# Patient Record
Sex: FEMALE | Race: WHITE | HISPANIC OR LATINO | Employment: FULL TIME | URBAN - METROPOLITAN AREA
[De-identification: names, ages, dates, MRNs, and addresses within clinical notes are randomized per-mention and may not be internally consistent; named-entity substitution may affect disease eponyms.]

---

## 2021-03-17 ENCOUNTER — OFFICE VISIT (OUTPATIENT)
Dept: URGENT CARE | Facility: CLINIC | Age: 36
End: 2021-03-17
Payer: COMMERCIAL

## 2021-03-17 VITALS
OXYGEN SATURATION: 96 % | TEMPERATURE: 97.6 F | HEIGHT: 65 IN | SYSTOLIC BLOOD PRESSURE: 136 MMHG | BODY MASS INDEX: 43.25 KG/M2 | DIASTOLIC BLOOD PRESSURE: 89 MMHG | RESPIRATION RATE: 14 BRPM | WEIGHT: 259.6 LBS | HEART RATE: 90 BPM

## 2021-03-17 DIAGNOSIS — Z02.4 ENCOUNTER FOR DRIVER'S LICENSE HISTORY AND PHYSICAL: Primary | ICD-10-CM

## 2021-03-17 PROCEDURE — 99213 OFFICE O/P EST LOW 20 MIN: CPT | Performed by: NURSE PRACTITIONER

## 2021-03-17 NOTE — PROGRESS NOTES
[address] [facility]        NAME: Tank Ruano is a 39 y o  female  : 1985    MRN: [id number]  DATE: 2021  TIME: 12:58 PM    Assessment and Plan   Encounter for 's license history and physical [Z02 4]  1  Encounter for 's license history and physical           Patient Instructions       Follow up with PCP in 3-5 days  Proceed to  ER if symptoms worsen  Chief Complaint     Chief Complaint   Patient presents with   Daphney Low     Pt is here for drivers permit         History of Present Illness       Patient is a 39year old female presenting for drivers permit physical exam  PMH of asthma with occasional inhaler use  Denies additional medical conditions  Denies alcohol or drug abuse  Review of Systems   Review of Systems   Constitutional: Negative for activity change, chills and fever  HENT: Negative for hearing loss  Eyes: Negative for visual disturbance  Respiratory: Negative for cough  Cardiovascular: Negative for chest pain  Musculoskeletal: Negative for gait problem  Neurological: Negative for dizziness, speech difficulty, weakness and headaches  Current Medications     No current outpatient medications on file  Current Allergies     Allergies as of 2021    (No Known Allergies)            The following portions of the patient's history were reviewed and updated as appropriate: allergies, current medications, past family history, past medical history, past social history, past surgical history and problem list      Past Medical History:   Diagnosis Date    Asthma        Past Surgical History:   Procedure Laterality Date    CHOLECYSTECTOMY         No family history on file  Medications have been verified          Objective   /89   Pulse 90   Temp 97 6 °F (36 4 °C)   Resp 14   Ht 5' 5" (1 651 m)   Wt 118 kg (259 lb 9 6 oz)   SpO2 96%   BMI 43 20 kg/m²        Physical Exam     Physical Exam  Constitutional: General: She is not in acute distress  Appearance: Normal appearance  She is well-developed  HENT:      Head: Normocephalic and atraumatic  Right Ear: Hearing, tympanic membrane, ear canal and external ear normal       Left Ear: Hearing, tympanic membrane, ear canal and external ear normal       Nose: Nose normal    Eyes:      Conjunctiva/sclera: Conjunctivae normal       Pupils: Pupils are equal, round, and reactive to light  Neck:      Musculoskeletal: Full passive range of motion without pain and normal range of motion  Cardiovascular:      Rate and Rhythm: Normal rate and regular rhythm  Heart sounds: Normal heart sounds, S1 normal and S2 normal  No murmur  Pulmonary:      Effort: Pulmonary effort is normal  No respiratory distress  Breath sounds: Normal breath sounds  No decreased breath sounds or wheezing  Abdominal:      Palpations: Abdomen is soft  Skin:     General: Skin is warm and dry  Neurological:      General: No focal deficit present  Mental Status: She is alert and oriented to person, place, and time  Cranial Nerves: No cranial nerve deficit

## 2021-04-07 ENCOUNTER — HOSPITAL ENCOUNTER (EMERGENCY)
Facility: HOSPITAL | Age: 36
Discharge: HOME | End: 2021-04-07
Attending: INTERNAL MEDICINE
Payer: COMMERCIAL

## 2021-04-07 ENCOUNTER — APPOINTMENT (EMERGENCY)
Dept: RADIOLOGY | Facility: HOSPITAL | Age: 36
End: 2021-04-07
Payer: COMMERCIAL

## 2021-04-07 VITALS
DIASTOLIC BLOOD PRESSURE: 72 MMHG | BODY MASS INDEX: 43.35 KG/M2 | OXYGEN SATURATION: 98 % | TEMPERATURE: 98.2 F | HEART RATE: 68 BPM | WEIGHT: 260.2 LBS | SYSTOLIC BLOOD PRESSURE: 142 MMHG | RESPIRATION RATE: 18 BRPM | HEIGHT: 65 IN

## 2021-04-07 DIAGNOSIS — M25.561 ARTHRALGIA OF RIGHT KNEE: Primary | ICD-10-CM

## 2021-04-07 PROCEDURE — 99282 EMERGENCY DEPT VISIT SF MDM: CPT | Performed by: INTERNAL MEDICINE

## 2021-04-07 PROCEDURE — 73564 X-RAY EXAM KNEE 4 OR MORE: CPT

## 2021-04-07 PROCEDURE — 99283 EMERGENCY DEPT VISIT LOW MDM: CPT

## 2021-04-07 RX ORDER — IBUPROFEN 600 MG/1
600 TABLET ORAL EVERY 6 HOURS PRN
Qty: 30 TABLET | Refills: 0 | Status: SHIPPED | OUTPATIENT
Start: 2021-04-07

## 2021-04-07 RX ORDER — IBUPROFEN 600 MG/1
600 TABLET ORAL ONCE
Status: COMPLETED | OUTPATIENT
Start: 2021-04-07 | End: 2021-04-07

## 2021-04-07 RX ORDER — IBUPROFEN 600 MG/1
600 TABLET ORAL ONCE
Status: DISCONTINUED | OUTPATIENT
Start: 2021-04-07 | End: 2021-04-07 | Stop reason: SDUPTHER

## 2021-04-07 RX ADMIN — IBUPROFEN 600 MG: 600 TABLET ORAL at 12:55

## 2021-04-07 NOTE — ED PROVIDER NOTES
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 and she has pain especially when she bends her knee  Patient denies any fall or trauma to the knee  Patient she is obese she is 260 lb  Patient denies any redness or tenderness of the knee  None       Past Medical History:   Diagnosis Date    Asthma        Past Surgical History:   Procedure Laterality Date    CHOLECYSTECTOMY         History reviewed  No pertinent family history  I have reviewed and agree with the history as documented  E-Cigarette/Vaping    E-Cigarette Use Current Every Day User      E-Cigarette/Vaping Substances     Social History     Tobacco Use    Smoking status: Never Smoker    Smokeless tobacco: Never Used   Substance Use Topics    Alcohol use: Not Currently    Drug use: Not Currently       Review of Systems   Constitutional: Negative for fatigue and fever  Respiratory: Negative for cough and shortness of breath  Cardiovascular: Negative for chest pain and palpitations  Gastrointestinal: Negative for abdominal pain, diarrhea, nausea and vomiting  Genitourinary: Negative for difficulty urinating, dysuria, flank pain and frequency  Musculoskeletal: Positive for arthralgias  Negative for back pain, myalgias, neck pain and neck stiffness  Skin: Negative for color change, pallor and rash  Neurological: Negative for dizziness, light-headedness and headaches  Psychiatric/Behavioral: Negative for agitation and behavioral problems  Physical Exam  Physical Exam  Constitutional:       General: She is not in acute distress  Appearance: Normal appearance  She is obese  She is not ill-appearing  HENT:      Head: Normocephalic  Musculoskeletal: Normal range of motion           General: No swelling, tenderness, deformity or signs of injury  Right lower leg: No edema  Left lower leg: No edema  Comments: Examination of the right knee shows no swelling no effusion  There is crepitus on flexing the knee  There is stability of the right and left collateral ligaments  Popliteal area at his no tenderness  The skin is intact no redness no tenderness  Skin:     General: Skin is warm  Capillary Refill: Capillary refill takes less than 2 seconds  Neurological:      Mental Status: She is alert and oriented to person, place, and time  Psychiatric:         Mood and Affect: Mood normal          Behavior: Behavior normal          Vital Signs  ED Triage Vitals   Temperature Pulse Respirations Blood Pressure SpO2   04/07/21 1201 04/07/21 1201 04/07/21 1201 04/07/21 1201 04/07/21 1201   98 2 °F (36 8 °C) 68 18 142/72 98 %      Temp Source Heart Rate Source Patient Position - Orthostatic VS BP Location FiO2 (%)   04/07/21 1201 04/07/21 1201 04/07/21 1201 04/07/21 1201 --   Oral Monitor Sitting Right arm       Pain Score       04/07/21 1255       8           Vitals:    04/07/21 1201   BP: 142/72   Pulse: 68   Patient Position - Orthostatic VS: Sitting         Visual Acuity      ED Medications  Medications   ibuprofen (MOTRIN) tablet 600 mg (600 mg Oral Given 4/7/21 1255)       Diagnostic Studies  Results Reviewed     None                 XR knee 4+ vw right injury   Final Result by Mily Puga MD (04/07 1452)      No acute osseous abnormality  Mild patellofemoral joint space narrowing  Trace effusion            Workstation performed: [id number]                    Procedures  Procedures         ED Course  ED Course as of Apr 07 1851 Wed Apr 07, 2021   1255 X RAY R knee; DJD                                SBIRT 20yo+      Most Recent Value   SBIRT (25 yo +)   In order to provide better care to our patients, we are screening all of our patients for alcohol and drug use   Would it be okay to ask you these screening questions? Unable to answer at this time Filed at: 04/07/2021 1211                    [facility]  Number of Diagnoses or Management Options  Diagnosis management comments: CASE discussed with pt and informed about x ray results and need to follow up with orhopedist        Amount and/or Complexity of Data Reviewed  Tests in the radiology section of CPT®: ordered and reviewed    Risk of Complications, Morbidity, and/or Mortality  Presenting problems: low  Diagnostic procedures: low  Management options: low        Disposition  Final diagnoses:   Arthralgia of right knee     Time reflects when diagnosis was documented in both MDM as applicable and the Disposition within this note     Time User Action Codes Description Comment    4/7/2021 12:56 PM Nahomy Tran Add [M25 561] Arthralgia of right knee       ED Disposition     ED Disposition Condition Date/Time Comment    Discharge Stable Wed Apr 7, 2021 12:56 PM [place] discharge to home/self care  Follow-up Information     Follow up With Specialties Details Why [facility] \[facility]\"" Orthopedic Surgery In 3 days  [address]  [phone]            Discharge Medication List as of 4/7/2021 12:59 PM      START taking these medications    Details   ibuprofen (MOTRIN) 600 mg tablet Take 1 tablet (600 mg total) by mouth every 6 (six) hours as needed for mild pain for up to 30 doses, Starting Wed 4/7/2021, Normal           No discharge procedures on file      PDMP Review     None          ED Provider  Electronically Signed by           Brianne Rodriguez MD  04/07/21 9931

## 2021-04-14 ENCOUNTER — OFFICE VISIT (OUTPATIENT)
Dept: OBGYN CLINIC | Facility: OTHER | Age: 36
End: 2021-04-14
Payer: COMMERCIAL

## 2021-04-14 VITALS
SYSTOLIC BLOOD PRESSURE: 123 MMHG | BODY MASS INDEX: 43.32 KG/M2 | WEIGHT: 260 LBS | DIASTOLIC BLOOD PRESSURE: 87 MMHG | HEART RATE: 84 BPM | HEIGHT: 65 IN

## 2021-04-14 DIAGNOSIS — R29.898 WEAKNESS OF BOTH HIPS: ICD-10-CM

## 2021-04-14 DIAGNOSIS — M22.2X2 PATELLOFEMORAL SYNDROME OF BOTH KNEES: ICD-10-CM

## 2021-04-14 DIAGNOSIS — M17.11 PATELLOFEMORAL ARTHRITIS OF RIGHT KNEE: Primary | ICD-10-CM

## 2021-04-14 DIAGNOSIS — M22.2X1 PATELLOFEMORAL SYNDROME OF BOTH KNEES: ICD-10-CM

## 2021-04-14 PROCEDURE — 99204 OFFICE O/P NEW MOD 45 MIN: CPT | Performed by: FAMILY MEDICINE

## 2021-04-14 RX ORDER — MELOXICAM 15 MG/1
15 TABLET ORAL DAILY
Qty: 30 TABLET | Refills: 1 | Status: SHIPPED | OUTPATIENT
Start: 2021-04-14

## 2021-04-14 NOTE — PROGRESS NOTES
Assessment/Plan:  Assessment/Plan   Diagnoses and all orders for this visit:    Patellofemoral arthritis of right knee  -     meloxicam (MOBIC) 15 mg tablet; Take 1 tablet (15 mg total) by mouth daily  -     Ambulatory referral to Physical Therapy; Future    Patellofemoral syndrome of both knees  -     Ambulatory referral to Physical Therapy; Future    Weakness of both hips  -     Ambulatory referral to Physical Therapy; Future        39-year-old active female with right knee pain more than 2 weeks duration  Discussed with patient physical exam, radiographs, impression and plan  X-rays of the right knee are unremarkable for acute osseous abnormality  Physical noted for tenderness at the patellar undersurface  She has full extension and flexion to 120  There is no appreciable collateral ligament laxity  There is positive patellar inhibition and grind  There is no groin pain with RAMIN and FADDIR maneuvers of the hips  Clinical impression is that she is symptomatic from patellofemoral arthritis and abnormal patellofemoral mechanics  I discussed regimen of anti-inflammatory, supplements, and physical therapy  She is to take meloxicam 15 mg once daily with food consistently for 30 days and during that time not to take any ibuprofen or Aleve, but may take Tylenol  She is to start taking tumeric 500 mg twice daily, tart cherry at least 1000 mg daily, and glucosamine -chondroitin 2 to 3 times a day  She may apply topical CBD /hemp with less than 0 3% THC  She is to start physical therapy as soon as possible and do home exercises as directed  She will follow up in 6 weeks at which point she will be re-evaluated  Subjective:   Patient ID: Waldemar Males is a 39 y o  female  Chief Complaint   Patient presents with    Right Knee - Pain        39-year-old female presents for evaluation of right knee pain of 3 weeks duration  She does not recall a particular trauma or inciting event    Pain started about 2 days after starting to work for [employer]  Pain described as gradual in onset, generalized to the knee but worse at the anterior medial aspect, aching and sometimes sharp, radiating distally along the anterior aspect of lower leg, worse with twisting, worse with stairs, worse with rising from seated position, associated with audible popping, and improved resting from activity  She presented to the emergency room at which point x-ray evaluation was noted for mild degenerative changes  She was advised on taking anti-inflammatories and referred to orthopedic care  Knee Pain  This is a new problem  The current episode started 1 to 4 weeks ago  The problem occurs daily  The problem has been unchanged  Associated symptoms include arthralgias  Pertinent negatives include no abdominal pain, chest pain, chills, fever, joint swelling, numbness, rash, sore throat or weakness  The symptoms are aggravated by twisting (  Stairs, rising from seated position)  She has tried NSAIDs for the symptoms  The treatment provided mild relief  The following portions of the patient's history were reviewed and updated as appropriate: She  has a past medical history of Asthma  She  has a past surgical history that includes Cholecystectomy  Her family history is not on file  She  reports that she has never smoked  She has never used smokeless tobacco  She reports previous alcohol use  She reports previous drug use  She has No Known Allergies       Review of Systems   Constitutional: Negative for chills and fever  HENT: Negative for sore throat  Eyes: Negative for visual disturbance  Respiratory: Negative for shortness of breath  Cardiovascular: Negative for chest pain  Gastrointestinal: Negative for abdominal pain  Genitourinary: Negative for flank pain  Musculoskeletal: Positive for arthralgias  Negative for joint swelling  Skin: Negative for rash and wound  Neurological: Negative for weakness and numbness  Hematological: Does not bruise/bleed easily  Psychiatric/Behavioral: Negative for self-injury  Objective:  Vitals:    04/14/21 1350   BP: 123/87   BP Location: Left arm   Patient Position: Sitting   Cuff Size: Adult   Pulse: 84   Weight: 118 kg (260 lb)   Height: 5' 5" (1 651 m)     Right Ankle Exam     Tenderness   The patient is experiencing no tenderness  Swelling: none    Muscle Strength   Dorsiflexion:  5/5  Plantar flexion:  5/5      Right Knee Exam     Muscle Strength   The patient has normal right knee strength  Tenderness   Right knee tenderness location:   Patellar undersurface  Range of Motion   Extension: normal   Flexion: 120     Tests   Varus: negative Valgus: negative    Other   Swelling: none    Comments:   Positive patellar inhibition and grind      Right Hip Exam     Muscle Strength   Abduction: 4/5   Flexion: 5/5     Tests   RAMIN: negative    Comments:  Negative FADDIR      Left Hip Exam     Muscle Strength   Abduction: 4/5   Flexion: 5/5           Strength/Myotome Testing     Right Ankle/Foot   Dorsiflexion: 5  Plantar flexion: 5      Physical Exam  Vitals signs and nursing note reviewed  Constitutional:       General: She is not in acute distress  Appearance: She is well-developed  She is not ill-appearing or diaphoretic  HENT:      Head: Normocephalic  Right Ear: External ear normal       Left Ear: External ear normal    Eyes:      Conjunctiva/sclera: Conjunctivae normal    Neck:      Trachea: No tracheal deviation  Cardiovascular:      Rate and Rhythm: Normal rate  Pulmonary:      Effort: Pulmonary effort is normal  No respiratory distress  Abdominal:      General: There is no distension  Musculoskeletal:         General: Tenderness present  No swelling, deformity or signs of injury  Skin:     General: Skin is warm and dry  Coloration: Skin is not jaundiced or pale     Neurological:      Mental Status: She is alert and oriented to person, place, and time    Psychiatric:         Mood and Affect: Mood normal          Behavior: Behavior normal          Thought Content: Thought content normal          Judgment: Judgment normal          I have personally reviewed pertinent films in PACS and my interpretation is  no acute osseous abnormality of the right knee

## 2023-07-06 ENCOUNTER — OFFICE VISIT (OUTPATIENT)
Dept: URGENT CARE | Facility: MEDICAL CENTER | Age: 38
End: 2023-07-06
Payer: COMMERCIAL

## 2023-07-06 VITALS
DIASTOLIC BLOOD PRESSURE: 84 MMHG | BODY MASS INDEX: 39.15 KG/M2 | RESPIRATION RATE: 18 BRPM | WEIGHT: 235 LBS | HEART RATE: 67 BPM | SYSTOLIC BLOOD PRESSURE: 141 MMHG | OXYGEN SATURATION: 98 % | HEIGHT: 65 IN | TEMPERATURE: 98 F

## 2023-07-06 DIAGNOSIS — J00 NASOPHARYNGITIS: Primary | ICD-10-CM

## 2023-07-06 PROCEDURE — 99213 OFFICE O/P EST LOW 20 MIN: CPT | Performed by: PHYSICIAN ASSISTANT

## 2023-07-06 RX ORDER — BENZONATATE 200 MG/1
200 CAPSULE ORAL 3 TIMES DAILY PRN
Qty: 20 CAPSULE | Refills: 0 | Status: SHIPPED | OUTPATIENT
Start: 2023-07-06

## 2023-07-06 NOTE — PROGRESS NOTES
[facility]        NAME: Padmini Gallegos is a 43 y.o. female  : 1985    MRN: [id number]  DATE: 2023  TIME: 1:52 PM      Assessment and Plan     Nasopharyngitis [J00]  1. Nasopharyngitis  benzonatate (TESSALON) 200 MG capsule            Patient Instructions     Patient Instructions   Upper Respiratory Infection     Over-the-counter medications to help with symptoms    - Plain Robitussin or plain Mucinex as directed on the packaging for mucus relief   - Sudafed (for those without history of high blood pressure) or Coricidin HBP (for those with history of high blood pressure) for nasal/sinus congestion   - Ibuprofen or Acetaminophen for pain, fever, or sore throat    - Afrin nasal spray (do not use more than 5 days!) or saline nasal spray for nasal congestion   - Vitamin C supplements may help shorten duration of colds   Other measures to help with illness     - Get plenty of rest    - Increase your fluid intake while you feel ill (especially drinks with electrolytes such as Gatorade or Pedialyte)   When to return to your healthcare provider    - You develop a fever after being fever-free (>100 degrees F)   - You have chest tightness or shortness of breath    - Symptoms worsen after initially getting better    - Symptoms persist more than 10 days        Follow up with PCP in 3-5 days. Go to ER if symptoms worsen. Chief Complaint     Chief Complaint   Patient presents with   • Sore Throat     Pt sts the sore throat started 3 days ago   • Cough     Pt has a productive cough with green mucus for 3 days         History of Present Illness     Patient presents with a sore throat and cough x 3 days. She states the cough is producing green mucus. She has not done anything at home to help with symptoms. Review of Systems     Review of Systems   Constitutional: Negative for chills, fatigue and fever. HENT: Positive for sore throat.  Negative for congestion, ear pain, postnasal drip, rhinorrhea, sinus pressure and sinus pain. Eyes: Negative for pain and visual disturbance. Respiratory: Positive for cough. Negative for chest tightness and shortness of breath. Cardiovascular: Negative for chest pain and palpitations. Gastrointestinal: Negative for abdominal pain, diarrhea, nausea and vomiting. Genitourinary: Negative for dysuria and hematuria. Musculoskeletal: Negative for arthralgias, back pain and myalgias. Skin: Negative for rash. Neurological: Negative for dizziness, seizures, syncope, numbness and headaches. All other systems reviewed and are negative. Current Medications       Current Outpatient Medications:   •  benzonatate (TESSALON) 200 MG capsule, Take 1 capsule (200 mg total) by mouth 3 (three) times a day as needed for cough, Disp: 20 capsule, Rfl: 0  •  ibuprofen (MOTRIN) 600 mg tablet, Take 1 tablet (600 mg total) by mouth every 6 (six) hours as needed for mild pain for up to 30 doses (Patient not taking: Reported on 7/6/2023), Disp: 30 tablet, Rfl: 0  •  meloxicam (MOBIC) 15 mg tablet, Take 1 tablet (15 mg total) by mouth daily (Patient not taking: Reported on 7/6/2023), Disp: 30 tablet, Rfl: 1    Current Allergies     Allergies as of 07/06/2023   • (No Known Allergies)              The following portions of the patient's history were reviewed and updated as appropriate: allergies, current medications, past family history, past medical history, past social history, past surgical history, and problem list.     Past Medical History:   Diagnosis Date   • Asthma        Past Surgical History:   Procedure Laterality Date   • CHOLECYSTECTOMY     • GASTRECTOMY SLEEVE LAPAROSCOPIC  06/05/2023       History reviewed. No pertinent family history. Medications have been verified.         Objective     /84 (BP Location: Left arm)   Pulse 67   Temp 98 °F (36.7 °C)   Resp 18   Ht 5' 5" (1.651 m)   Wt 107 kg (235 lb)   SpO2 98%   BMI 39.11 kg/m²   No LMP recorded. Physical Exam     Physical Exam  Vitals and nursing note reviewed. Constitutional:       Appearance: She is well-developed and normal weight. HENT:      Head: Normocephalic. Nose: Congestion present. No rhinorrhea. Mouth/Throat:      Mouth: Mucous membranes are moist.      Pharynx: Posterior oropharyngeal erythema present. No pharyngeal swelling. Tonsils: No tonsillar exudate. 0 on the right. 0 on the left. Cardiovascular:      Rate and Rhythm: Normal rate and regular rhythm. Heart sounds: Normal heart sounds. Pulmonary:      Effort: Pulmonary effort is normal.      Breath sounds: Normal breath sounds. Lymphadenopathy:      Cervical: No cervical adenopathy. Skin:     General: Skin is warm and dry. Neurological:      General: No focal deficit present. Mental Status: She is alert and oriented to person, place, and time.    Psychiatric:         Mood and Affect: Mood normal.         Behavior: Behavior normal.